# Patient Record
Sex: FEMALE | Race: WHITE | URBAN - METROPOLITAN AREA
[De-identification: names, ages, dates, MRNs, and addresses within clinical notes are randomized per-mention and may not be internally consistent; named-entity substitution may affect disease eponyms.]

---

## 2021-11-29 ENCOUNTER — APPOINTMENT (RX ONLY)
Dept: URBAN - METROPOLITAN AREA CLINIC 176 | Facility: CLINIC | Age: 46
Setting detail: DERMATOLOGY
End: 2021-11-29

## 2021-11-29 VITALS — WEIGHT: 165 LBS | HEIGHT: 61 IN

## 2021-11-29 DIAGNOSIS — L72.8 OTHER FOLLICULAR CYSTS OF THE SKIN AND SUBCUTANEOUS TISSUE: ICD-10-CM | Status: STABLE

## 2021-11-29 PROCEDURE — ? COUNSELING

## 2021-11-29 PROCEDURE — ? PHOTO-DOCUMENTATION

## 2021-11-29 PROCEDURE — 99202 OFFICE O/P NEW SF 15 MIN: CPT

## 2021-11-29 ASSESSMENT — LOCATION SIMPLE DESCRIPTION DERM: LOCATION SIMPLE: CHEST

## 2021-11-29 ASSESSMENT — LOCATION DETAILED DESCRIPTION DERM: LOCATION DETAILED: LEFT MEDIAL SUPERIOR CHEST

## 2021-11-29 ASSESSMENT — LOCATION ZONE DERM: LOCATION ZONE: TRUNK

## 2021-11-29 NOTE — PROCEDURE: REASSURANCE
Detail Level: Simple
Additional Notes (Optional): Plan to send for DH/Surg team review\\nPatient is leaning towards excision\\nConsider plastics
Hide Additional Notes?: No